# Patient Record
(demographics unavailable — no encounter records)

---

## 2025-02-10 NOTE — PHYSICAL EXAM
[de-identified] : Well appearing person in no acute distress. Appears their stated age. Alert and oriented x3. Normal mood and affect. There is no significant swelling present on exam today. They have full range of motion of their knee. No evidence of any erythema or bruising. They have a stable knee to ligamentous exam. No significant medial or lateral joint line tenderness. Negative Cassandra test. They have tenderness on the undersurface of their patella. Their quadriceps muscle is weak and their hamstrings and IT band are tight. No pain with range of motion of their hip or ankle. Neurovascularly intact in their lower extremity.  He also has some discomfort on strength testing of his hamstring today as well as some tenderness along the distal hamstring  [de-identified] : Multiple weightbearing views right knee were ordered and reviewed.  These demonstrate no acute osseous abnormalities.  Joint spaces well-maintained

## 2025-02-10 NOTE — DISCUSSION/SUMMARY
[de-identified] : I had a long discussion with the patient about the nature of their condition and all available treatment options. We discussed operative and non-operative interventions. After a long discussion, they would like to proceed with structured physical therapy. We also discussed use of anti-inflammatory medications as well as their risks and benefits. I will see them back in approximately 6 weeks time. If they are still having pain, I would recommend an MRI. All of their questions were answered, they agree with the treatment plan

## 2025-02-10 NOTE — DISCUSSION/SUMMARY
[de-identified] : I had a long discussion with the patient about the nature of their condition and all available treatment options. We discussed operative and non-operative interventions. After a long discussion, they would like to proceed with structured physical therapy. We also discussed use of anti-inflammatory medications as well as their risks and benefits. I will see them back in approximately 6 weeks time. If they are still having pain, I would recommend an MRI. All of their questions were answered, they agree with the treatment plan

## 2025-02-10 NOTE — PHYSICAL EXAM
[de-identified] : Well appearing person in no acute distress. Appears their stated age. Alert and oriented x3. Normal mood and affect. There is no significant swelling present on exam today. They have full range of motion of their knee. No evidence of any erythema or bruising. They have a stable knee to ligamentous exam. No significant medial or lateral joint line tenderness. Negative Cassandra test. They have tenderness on the undersurface of their patella. Their quadriceps muscle is weak and their hamstrings and IT band are tight. No pain with range of motion of their hip or ankle. Neurovascularly intact in their lower extremity.  He also has some discomfort on strength testing of his hamstring today as well as some tenderness along the distal hamstring  [de-identified] : Multiple weightbearing views right knee were ordered and reviewed.  These demonstrate no acute osseous abnormalities.  Joint spaces well-maintained

## 2025-02-10 NOTE — HISTORY OF PRESENT ILLNESS
[de-identified] : Well-appearing male in no acute distress.  Alert and oriented x 3.  Appears his stated age.  Normal mood and affect.  He has full range of motion of his right knee.  He has some discomfort over his posterior aspect of his knee.  This started when he was in a football camp about December.  No injury that he can remember.  Pain is mostly in and around the posterior aspect of his knee.  He has been in physical therapy which has been helping a little bit.  No issues with this knee in the past although he has had a left hamstring tear in the past

## 2025-02-10 NOTE — HISTORY OF PRESENT ILLNESS
[de-identified] : Well-appearing male in no acute distress.  Alert and oriented x 3.  Appears his stated age.  Normal mood and affect.  He has full range of motion of his right knee.  He has some discomfort over his posterior aspect of his knee.  This started when he was in a football camp about December.  No injury that he can remember.  Pain is mostly in and around the posterior aspect of his knee.  He has been in physical therapy which has been helping a little bit.  No issues with this knee in the past although he has had a left hamstring tear in the past